# Patient Record
Sex: MALE | Race: WHITE | NOT HISPANIC OR LATINO | Employment: FULL TIME | URBAN - METROPOLITAN AREA
[De-identification: names, ages, dates, MRNs, and addresses within clinical notes are randomized per-mention and may not be internally consistent; named-entity substitution may affect disease eponyms.]

---

## 2023-08-23 PROBLEM — Z99.89 DEPENDENCE ON ENABLING MACHINE: Status: ACTIVE | Noted: 2023-08-23

## 2023-08-23 PROBLEM — J01.90 ACUTE SINUSITIS: Status: ACTIVE | Noted: 2023-08-23

## 2023-08-23 PROBLEM — I10 ESSENTIAL (PRIMARY) HYPERTENSION: Status: ACTIVE | Noted: 2023-08-23

## 2023-08-23 PROBLEM — J45.909 ASTHMATIC BRONCHITIS (HHS-HCC): Status: ACTIVE | Noted: 2023-08-23

## 2023-08-23 RX ORDER — FLUTICASONE PROPIONATE 50 MCG
2 SPRAY, SUSPENSION (ML) NASAL DAILY PRN
COMMUNITY
End: 2023-10-12 | Stop reason: ALTCHOICE

## 2023-08-23 RX ORDER — DIPHENHYDRAMINE HCL 25 MG
2 CAPSULE ORAL EVERY 8 HOURS PRN
COMMUNITY

## 2023-08-23 RX ORDER — LEVOCETIRIZINE DIHYDROCHLORIDE 5 MG/1
1 TABLET, FILM COATED ORAL DAILY
COMMUNITY
Start: 2019-09-10 | End: 2023-10-12 | Stop reason: ALTCHOICE

## 2023-08-23 RX ORDER — AMLODIPINE BESYLATE 10 MG/1
1 TABLET ORAL DAILY
COMMUNITY
End: 2023-10-12 | Stop reason: SDUPTHER

## 2023-08-23 RX ORDER — METOPROLOL SUCCINATE 100 MG/1
1 TABLET, EXTENDED RELEASE ORAL DAILY
COMMUNITY
End: 2023-10-12 | Stop reason: SDUPTHER

## 2023-10-12 ENCOUNTER — OFFICE VISIT (OUTPATIENT)
Dept: PRIMARY CARE | Facility: CLINIC | Age: 43
End: 2023-10-12
Payer: COMMERCIAL

## 2023-10-12 VITALS
RESPIRATION RATE: 18 BRPM | SYSTOLIC BLOOD PRESSURE: 140 MMHG | HEIGHT: 71 IN | TEMPERATURE: 97.8 F | BODY MASS INDEX: 40.91 KG/M2 | OXYGEN SATURATION: 97 % | WEIGHT: 292.2 LBS | HEART RATE: 75 BPM | DIASTOLIC BLOOD PRESSURE: 90 MMHG

## 2023-10-12 DIAGNOSIS — Z23 ENCOUNTER FOR IMMUNIZATION: ICD-10-CM

## 2023-10-12 DIAGNOSIS — I10 ESSENTIAL (PRIMARY) HYPERTENSION: Primary | ICD-10-CM

## 2023-10-12 PROCEDURE — 99213 OFFICE O/P EST LOW 20 MIN: CPT | Performed by: FAMILY MEDICINE

## 2023-10-12 PROCEDURE — 1036F TOBACCO NON-USER: CPT | Performed by: FAMILY MEDICINE

## 2023-10-12 PROCEDURE — 90471 IMMUNIZATION ADMIN: CPT | Performed by: FAMILY MEDICINE

## 2023-10-12 PROCEDURE — 3077F SYST BP >= 140 MM HG: CPT | Performed by: FAMILY MEDICINE

## 2023-10-12 PROCEDURE — 3080F DIAST BP >= 90 MM HG: CPT | Performed by: FAMILY MEDICINE

## 2023-10-12 RX ORDER — FLUTICASONE PROPIONATE AND SALMETEROL 250; 50 UG/1; UG/1
1 POWDER RESPIRATORY (INHALATION) 2 TIMES DAILY
COMMUNITY
Start: 2012-10-30

## 2023-10-12 RX ORDER — METOPROLOL SUCCINATE 100 MG/1
100 TABLET, EXTENDED RELEASE ORAL DAILY
Qty: 90 TABLET | Refills: 3 | Status: SHIPPED | OUTPATIENT
Start: 2023-10-12 | End: 2024-02-08 | Stop reason: SDUPTHER

## 2023-10-12 RX ORDER — AMLODIPINE BESYLATE 10 MG/1
10 TABLET ORAL DAILY
Qty: 90 TABLET | Refills: 3 | Status: SHIPPED | OUTPATIENT
Start: 2023-10-12 | End: 2024-02-08 | Stop reason: SDUPTHER

## 2023-10-12 ASSESSMENT — PAIN SCALES - GENERAL: PAINLEVEL: 0-NO PAIN

## 2023-10-12 ASSESSMENT — PATIENT HEALTH QUESTIONNAIRE - PHQ9
1. LITTLE INTEREST OR PLEASURE IN DOING THINGS: NOT AT ALL
SUM OF ALL RESPONSES TO PHQ9 QUESTIONS 1 & 2: 0
2. FEELING DOWN, DEPRESSED OR HOPELESS: NOT AT ALL

## 2023-10-12 NOTE — PROGRESS NOTES
"Subjective       Patient ID: Camron Constantino is a 43 y.o. male here for f/u HTN. He denies CP, SOB, or edema. He gets some HOLMAN with stairs but he is capable of running in events. He has not lot weight.BP has not been checked at home.    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    He indicated that his mother is alive. He indicated that his father is . He indicated that the status of his sister is unknown.      Social History     Tobacco Use   Smoking Status Never   Smokeless Tobacco Never       Objectives:  Vitals:    10/12/23 0825   BP: 140/90  Comment: initial /94   Pulse: 75   Resp: 18   Temp: 36.6 °C (97.8 °F)   SpO2: 97%   Weight: 133 kg (292 lb 3.2 oz)   Height: 1.803 m (5' 11\")   PainSc: 0-No pain     Body mass index is 40.75 kg/m².  Lungs: CTA  Heart: RRR  Abd: NABS, soft, NT  Ext: no c,c,e, good pulses      Assessment/Plan   Diagnoses and all orders for this visit:  Essential (primary) hypertension  -     amLODIPine (Norvasc) 10 mg tablet; Take 1 tablet (10 mg) by mouth once daily.  -     metoprolol succinate XL (Toprol-XL) 100 mg 24 hr tablet; Take 1 tablet (100 mg) by mouth once daily.  for 90 day(s)  Encounter for immunization  -     Flu vaccine, quadrivalent, recombinant, preservative free, adult (FLUBLOK)  Other orders  -     Follow Up In Primary Care - Health Maintenance; Future         Jacqueline Kapadia M.D.  Family Medicine Physician   "

## 2024-02-08 DIAGNOSIS — I10 ESSENTIAL (PRIMARY) HYPERTENSION: ICD-10-CM

## 2024-02-08 RX ORDER — AMLODIPINE BESYLATE 10 MG/1
10 TABLET ORAL DAILY
Qty: 90 TABLET | Refills: 0 | Status: SHIPPED | OUTPATIENT
Start: 2024-02-08 | End: 2025-02-07

## 2024-02-08 RX ORDER — METOPROLOL SUCCINATE 100 MG/1
100 TABLET, EXTENDED RELEASE ORAL DAILY
Qty: 90 TABLET | Refills: 0 | Status: SHIPPED | OUTPATIENT
Start: 2024-02-08 | End: 2025-02-07

## 2024-04-12 ENCOUNTER — APPOINTMENT (OUTPATIENT)
Dept: PRIMARY CARE | Facility: CLINIC | Age: 44
End: 2024-04-12
Payer: COMMERCIAL

## 2024-06-27 DIAGNOSIS — I10 ESSENTIAL (PRIMARY) HYPERTENSION: ICD-10-CM

## 2024-06-27 NOTE — TELEPHONE ENCOUNTER
Needs refill on Amlodipine and Metoprolol sent to [facility]. He is out of his medication and is asking for 30 day be sent to local pharmacy while the rest is to be sent to the mail order.     FU: 7/19/24

## 2024-07-01 RX ORDER — METOPROLOL SUCCINATE 100 MG/1
100 TABLET, EXTENDED RELEASE ORAL DAILY
Qty: 30 TABLET | Refills: 0 | Status: SHIPPED | OUTPATIENT
Start: 2024-07-01 | End: 2024-07-31

## 2024-07-01 RX ORDER — AMLODIPINE BESYLATE 10 MG/1
10 TABLET ORAL DAILY
Qty: 30 TABLET | Refills: 0 | Status: SHIPPED | OUTPATIENT
Start: 2024-07-01 | End: 2024-07-31

## 2024-07-01 NOTE — TELEPHONE ENCOUNTER
30 day coverage sent. Will see patient at upcoming appointment to make sure regimen is stable prior to sending mail order refills

## 2024-07-19 ENCOUNTER — OFFICE VISIT (OUTPATIENT)
Dept: PRIMARY CARE | Facility: CLINIC | Age: 44
End: 2024-07-19
Payer: COMMERCIAL

## 2024-07-19 VITALS
HEART RATE: 61 BPM | SYSTOLIC BLOOD PRESSURE: 130 MMHG | WEIGHT: 278.4 LBS | BODY MASS INDEX: 38.83 KG/M2 | DIASTOLIC BLOOD PRESSURE: 80 MMHG | OXYGEN SATURATION: 97 % | TEMPERATURE: 99 F

## 2024-07-19 DIAGNOSIS — Z12.5 PROSTATE CANCER SCREENING: ICD-10-CM

## 2024-07-19 DIAGNOSIS — Z11.59 NEED FOR HEPATITIS C SCREENING TEST: ICD-10-CM

## 2024-07-19 DIAGNOSIS — I10 ESSENTIAL (PRIMARY) HYPERTENSION: Primary | ICD-10-CM

## 2024-07-19 DIAGNOSIS — G47.33 OSA (OBSTRUCTIVE SLEEP APNEA): ICD-10-CM

## 2024-07-19 DIAGNOSIS — R73.9 HYPERGLYCEMIA: ICD-10-CM

## 2024-07-19 PROBLEM — J45.909 ASTHMATIC BRONCHITIS (HHS-HCC): Status: RESOLVED | Noted: 2023-08-23 | Resolved: 2024-07-19

## 2024-07-19 PROCEDURE — 3079F DIAST BP 80-89 MM HG: CPT | Performed by: FAMILY MEDICINE

## 2024-07-19 PROCEDURE — 99214 OFFICE O/P EST MOD 30 MIN: CPT | Performed by: FAMILY MEDICINE

## 2024-07-19 PROCEDURE — 3075F SYST BP GE 130 - 139MM HG: CPT | Performed by: FAMILY MEDICINE

## 2024-07-19 RX ORDER — METOPROLOL SUCCINATE 100 MG/1
100 TABLET, EXTENDED RELEASE ORAL DAILY
Qty: 90 TABLET | Refills: 3 | Status: SHIPPED | OUTPATIENT
Start: 2024-07-19 | End: 2025-07-19

## 2024-07-19 RX ORDER — AMLODIPINE BESYLATE 10 MG/1
10 TABLET ORAL DAILY
Qty: 90 TABLET | Refills: 3 | Status: SHIPPED | OUTPATIENT
Start: 2024-07-19 | End: 2025-07-19

## 2024-07-19 ASSESSMENT — ENCOUNTER SYMPTOMS
LOSS OF SENSATION IN FEET: 0
DEPRESSION: 0
OCCASIONAL FEELINGS OF UNSTEADINESS: 0

## 2024-07-19 ASSESSMENT — LIFESTYLE VARIABLES
SKIP TO QUESTIONS 9-10: 0
HOW OFTEN DO YOU HAVE SIX OR MORE DRINKS ON ONE OCCASION: NEVER
AUDIT-C TOTAL SCORE: 4
HOW OFTEN DO YOU HAVE A DRINK CONTAINING ALCOHOL: 2-3 TIMES A WEEK
HOW MANY STANDARD DRINKS CONTAINING ALCOHOL DO YOU HAVE ON A TYPICAL DAY: 3 OR 4

## 2024-07-19 ASSESSMENT — PATIENT HEALTH QUESTIONNAIRE - PHQ9
2. FEELING DOWN, DEPRESSED OR HOPELESS: NOT AT ALL
1. LITTLE INTEREST OR PLEASURE IN DOING THINGS: NOT AT ALL
SUM OF ALL RESPONSES TO PHQ9 QUESTIONS 1 & 2: 0

## 2024-07-19 ASSESSMENT — PAIN SCALES - GENERAL: PAINLEVEL: 0-NO PAIN

## 2024-07-19 NOTE — PROGRESS NOTES
Outpatient Visit Note    Chief Complaint   Patient presents with    Hypertension         HPI:  Camron Constantino is a 44 y.o. male who presents to the office to establish with new PCP and for medication follow up. He was previously established with Dr. Kapadia, having last been seen on 10/12/23 for blood pressure follow up. At that time he was doing well on stable regimen of amlodipine 10mg and metoprolol XL 100mg. No reports of chest pain, shortness of breath, lightheadedness or dizziness. Continues to be compliant with no reported adverse side efforts or symptomatic complaints.    Last panel of blood work completed on 4/21/23 including CBC, CMP and lipid panel, which was remarkable for mild hyperglycemia.    Does have history of RINA to which he has historically used a CPAP for the last several years. Denies have any monitoring of setting with equipment recently malfunctioning. Would be interested in having new assessment for replacement equipment.    Current Medications  Current Outpatient Medications   Medication Instructions    amLODIPine (NORVASC) 10 mg, oral, Daily    diphenhydrAMINE (BENADryl) 25 mg capsule 2 capsules, oral, Every 8 hours PRN    metoprolol succinate XL (TOPROL-XL) 100 mg, oral, Daily,  for 90 day(s)    multivitamin,therapeutic (THERAPEUTIC MULTIVITAMIN ORAL) 1 tablet, oral, Daily        Allergies  Allergies   Allergen Reactions    Erythromycin Other     vomiting    Penicillins Other     Upset stomach        Past Medical History:   Diagnosis Date    Arthritis     Hypertension     Microstomia     Microstomia      Past Surgical History:   Procedure Laterality Date    OTHER SURGICAL HISTORY  03/01/2016    Otoplasty - Left Ear     Family History   Problem Relation Name Age of Onset    Hypertension Mother      Other (ruptured AAA) Father      No Known Problems Sister       Social History     Tobacco Use    Smoking status: Never    Smokeless tobacco: Never   Vaping Use    Vaping status: Never Used    Substance Use Topics    Alcohol use: Yes     Alcohol/week: 2.0 standard drinks of alcohol     Types: 2 Standard drinks or equivalent per week    Drug use: Never       ROS  All pertinent positive symptoms are included in the history of present illness.  All other systems have been reviewed and are negative and noncontributory to this patient's current ailments.    VITAL SIGNS  Vitals:    07/19/24 1456   BP: 130/80   Pulse: 61   Temp: 37.2 °C (99 °F)   SpO2: 97%       PHYSICAL EXAM  GENERAL APPEARANCE: alert and oriented, Pleasant and cooperative, No Acute Distress.   HEENT: EOMI, PERRLA, MMM.   NECK: no lymphadenopathy, no thyromegaly, supple.   HEART:  regular rate and rhythm, no murmurs.   LUNGS: clear to auscultation bilaterally, no wheezes/rhonchi/rales.   EXTREMITIES: no edema, pulses 2 plus bilaterally, no clubbing.   SKIN: normal, warm, dry, no rash.   NEUROLOGIC EXAM: non-focal exam; DTR's 2+ bilaterally and symmetric, tone normal.   MUSCULOSKELETAL: no gross abnormalities, FROM of all extremities.          Assessment/Plan   Problem List Items Addressed This Visit             ICD-10-CM    Essential (primary) hypertension - Primary I10     - Blood pressure stable in office today  - Will continue on current regimen without modification  - Continue to focus on healthy, balanced diet with moderation of salt/caffiene         Relevant Medications    amLODIPine (Norvasc) 10 mg tablet    metoprolol succinate XL (Toprol-XL) 100 mg 24 hr tablet    Other Relevant Orders    TSH with reflex to Free T4 if abnormal    Lipid Panel    Comprehensive Metabolic Panel    CBC    RINA (obstructive sleep apnea) G47.33    Relevant Orders    Referral to Adult Sleep Medicine     Other Visit Diagnoses         Codes    Prostate cancer screening     Z12.5    Relevant Orders    Prostate Spec.Ag,Screen    Need for hepatitis C screening test     Z11.59    Relevant Orders    Hepatitis C antibody    Hyperglycemia     R73.9    Relevant Orders     Hemoglobin A1c            Counseling:       Medication education:         Education:  The patient is counseled regarding potential side-effects of all new medications        Understanding:  Patient expressed understanding        Adherence:  No barriers to adherence identified    ** Please excuse any errors in grammar or translation related to this dictation. Voice recognition software was utilized to prepare this document. **

## 2024-07-19 NOTE — PATIENT INSTRUCTIONS
Problem List Items Addressed This Visit             ICD-10-CM    Essential (primary) hypertension - Primary I10     - Blood pressure stable in office today  - Will continue on current regimen without modification  - Continue to focus on healthy, balanced diet with moderation of salt/caffiene         Relevant Medications    amLODIPine (Norvasc) 10 mg tablet    metoprolol succinate XL (Toprol-XL) 100 mg 24 hr tablet    Other Relevant Orders    TSH with reflex to Free T4 if abnormal    Lipid Panel    Comprehensive Metabolic Panel    CBC     Other Visit Diagnoses         Codes    Prostate cancer screening     Z12.5    Relevant Orders    Prostate Spec.Ag,Screen    Need for hepatitis C screening test     Z11.59    Relevant Orders    Hepatitis C antibody    Hyperglycemia     R73.9    Relevant Orders    Hemoglobin A1c            Counseling:       Medication education:         Education:  The patient is counseled regarding potential side-effects of all new medications        Understanding:  Patient expressed understanding        Adherence:  No barriers to adherence identified    ** Please excuse any errors in grammar or translation related to this dictation. Voice recognition software was utilized to prepare this document. **

## 2024-07-19 NOTE — ASSESSMENT & PLAN NOTE
- Blood pressure stable in office today  - Will continue on current regimen without modification  - Continue to focus on healthy, balanced diet with moderation of salt/caffiene

## 2024-07-23 ENCOUNTER — LAB (OUTPATIENT)
Dept: LAB | Facility: LAB | Age: 44
End: 2024-07-23
Payer: COMMERCIAL

## 2024-07-23 DIAGNOSIS — Z11.59 NEED FOR HEPATITIS C SCREENING TEST: ICD-10-CM

## 2024-07-23 DIAGNOSIS — R73.9 HYPERGLYCEMIA: ICD-10-CM

## 2024-07-23 DIAGNOSIS — I10 ESSENTIAL (PRIMARY) HYPERTENSION: ICD-10-CM

## 2024-07-23 DIAGNOSIS — Z12.5 PROSTATE CANCER SCREENING: ICD-10-CM

## 2024-07-23 LAB
ALBUMIN SERPL-MCNC: 4.1 G/DL (ref 3.5–5)
ALP BLD-CCNC: 64 U/L (ref 35–125)
ALT SERPL-CCNC: 14 U/L (ref 5–40)
ANION GAP SERPL CALC-SCNC: 11 MMOL/L
AST SERPL-CCNC: 14 U/L (ref 5–40)
BILIRUB SERPL-MCNC: 0.4 MG/DL (ref 0.1–1.2)
BUN SERPL-MCNC: 14 MG/DL (ref 8–25)
CALCIUM SERPL-MCNC: 8.8 MG/DL (ref 8.5–10.4)
CHLORIDE SERPL-SCNC: 102 MMOL/L (ref 97–107)
CHOLEST SERPL-MCNC: 179 MG/DL (ref 133–200)
CHOLEST/HDLC SERPL: 4.2 {RATIO}
CO2 SERPL-SCNC: 26 MMOL/L (ref 24–31)
CREAT SERPL-MCNC: 1.1 MG/DL (ref 0.4–1.6)
EGFRCR SERPLBLD CKD-EPI 2021: 85 ML/MIN/1.73M*2
ERYTHROCYTE [DISTWIDTH] IN BLOOD BY AUTOMATED COUNT: 12.8 % (ref 11.5–14.5)
EST. AVERAGE GLUCOSE BLD GHB EST-MCNC: 111 MG/DL
GLUCOSE SERPL-MCNC: 105 MG/DL (ref 65–99)
HBA1C MFR BLD: 5.5 %
HCT VFR BLD AUTO: 44.5 % (ref 41–52)
HCV AB SER QL: NONREACTIVE
HDLC SERPL-MCNC: 43 MG/DL
HGB BLD-MCNC: 14.7 G/DL (ref 13.5–17.5)
LDLC SERPL CALC-MCNC: 115 MG/DL (ref 65–130)
MCH RBC QN AUTO: 29.5 PG (ref 26–34)
MCHC RBC AUTO-ENTMCNC: 33 G/DL (ref 32–36)
MCV RBC AUTO: 89 FL (ref 80–100)
NRBC BLD-RTO: 0 /100 WBCS (ref 0–0)
PLATELET # BLD AUTO: 185 X10*3/UL (ref 150–450)
POTASSIUM SERPL-SCNC: 4.3 MMOL/L (ref 3.4–5.1)
PROT SERPL-MCNC: 6.7 G/DL (ref 5.9–7.9)
PSA SERPL-MCNC: 0.2 NG/ML
RBC # BLD AUTO: 4.98 X10*6/UL (ref 4.5–5.9)
SODIUM SERPL-SCNC: 139 MMOL/L (ref 133–145)
TRIGL SERPL-MCNC: 107 MG/DL (ref 40–150)
TSH SERPL DL<=0.05 MIU/L-ACNC: 1.74 MIU/L (ref 0.27–4.2)
WBC # BLD AUTO: 6.7 X10*3/UL (ref 4.4–11.3)

## 2024-07-23 PROCEDURE — 84443 ASSAY THYROID STIM HORMONE: CPT

## 2024-07-23 PROCEDURE — 84153 ASSAY OF PSA TOTAL: CPT

## 2024-07-23 PROCEDURE — 80053 COMPREHEN METABOLIC PANEL: CPT

## 2024-07-23 PROCEDURE — 83036 HEMOGLOBIN GLYCOSYLATED A1C: CPT

## 2024-07-23 PROCEDURE — 80061 LIPID PANEL: CPT

## 2024-07-23 PROCEDURE — 85027 COMPLETE CBC AUTOMATED: CPT

## 2024-07-23 PROCEDURE — 36415 COLL VENOUS BLD VENIPUNCTURE: CPT

## 2024-07-23 PROCEDURE — 86803 HEPATITIS C AB TEST: CPT

## 2024-07-29 ENCOUNTER — PATIENT MESSAGE (OUTPATIENT)
Dept: PRIMARY CARE | Facility: CLINIC | Age: 44
End: 2024-07-29
Payer: COMMERCIAL

## 2024-07-29 DIAGNOSIS — R79.89 LOW TESTOSTERONE: Primary | ICD-10-CM

## 2024-08-02 ENCOUNTER — LAB (OUTPATIENT)
Dept: LAB | Facility: LAB | Age: 44
End: 2024-08-02
Payer: COMMERCIAL

## 2024-08-02 DIAGNOSIS — R79.89 LOW TESTOSTERONE: ICD-10-CM

## 2024-08-02 PROCEDURE — 36415 COLL VENOUS BLD VENIPUNCTURE: CPT

## 2024-08-02 PROCEDURE — 84402 ASSAY OF FREE TESTOSTERONE: CPT

## 2024-08-11 LAB
TESTOSTERONE FREE (CHAN): 61.6 PG/ML (ref 35–155)
TESTOSTERONE,TOTAL,LC-MS/MS: 308 NG/DL (ref 250–1100)

## 2024-08-20 NOTE — PROGRESS NOTES
"[facility]  New Visit Note        HISTORY OF PRESENT ILLNESS     The patient's referring provider is: Yohannes Roberts DO    HISTORY OF PRESENT ILLNESS   Camron Constantino is a 44 y.o. male who presents to a [facility] for a sleep medicine evaluation with concerns of Consult and Sleep Apnea.     PAST SLEEP HISTORY    Patient has the following sleep-related diagnoses and sleep study results: not available today - thinks severe    CURRENT HISTORY    On today's visit, the patient reports he has been on CPAP for last 15 years or so. His old machine is no longer working.    Without CPAP he is groggy when he wakes up. He does wake often but attributes this to limbs falling asleep. He feels he can't sleep without CPAP. His n20 nasal mask \"is alright\" - sometimes seal is hard to keep and it puts pressure on his face. Open to trying n30 or p10.    Using SO's CPAP at this time  He thinks 8-12 cm H2O    Sometimes goes on trips/camping. Interested in something he can use when he has no electricity.    STOP  3 STP  BANG 3 BNG    Sleep schedule  on weekdays / work days:  Usual Bedtime  10 p  Falls asleep around  10 p  Wake time  5 a    Sleep schedule  on weekends/non work days :  same    Naps:   no    Average sleep duration 6-8 hours/day    Preferred sleeping position: back, side    Sleep-related ROS:    Sleep Initiation: no problems going to sleep    Sleep Maintenance: wakes often due to limbs falling asleep    Recreational drug use  Smoking: never  Alcohol consumption: 2/week  Caffeine consumption:  daily  Marijuana: never    ESS: 8      PHYSICAL EXAM     VITAL SIGNS: /84   Pulse 68   Ht 1.778 m (5' 10\")   Wt 122 kg (270 lb)   SpO2 98%   BMI 38.74 kg/m²      PREVIOUS WEIGHTS:  Wt Readings from Last 3 Encounters:   08/21/24 122 kg (270 lb)   07/19/24 126 kg (278 lb 6.4 oz)   10/12/23 133 kg (292 lb 3.2 oz)       PHYSICAL EXAM: GENERAL: alert oriented x 3 " "pleasant and cooperative no acute distress  MODIFIED MALLAMPATI SCORE: 3+  LATERAL PHARYNGEAL WALL: 2+  NECK EXAM: normal supple no adenopathy    RESULTS/DATA     No results found for: \"IRON\", \"TRANSFERRIN\", \"IRONSAT\", \"TIBC\", \"FERRITIN\"    ASSESSMENT/PLAN     Mr. Constantino is a 44 y.o. male and was referred to the [facility] for the following issues:    OBSTRUCTIVE SLEEP APNEA / SLEEPINESS  -Ordering sleep study to evaluate  -consider OAT or snorerx or battery backup when no electricity    BMI>35  -Body mass index is 38.74 kg/m².  today  -With sufficient weight loss may no longer require treatment for RINA    HYPERTENSION  BP Readings from Last 3 Encounters:   08/21/24 132/84   07/19/24 130/80   10/12/23 140/90      -Has been mildly elevated with current treatments    Followup 3 weeks after sleep study to review results        "

## 2024-08-21 ENCOUNTER — OFFICE VISIT (OUTPATIENT)
Dept: SLEEP MEDICINE | Facility: CLINIC | Age: 44
End: 2024-08-21
Payer: COMMERCIAL

## 2024-08-21 VITALS
SYSTOLIC BLOOD PRESSURE: 132 MMHG | OXYGEN SATURATION: 98 % | BODY MASS INDEX: 38.65 KG/M2 | DIASTOLIC BLOOD PRESSURE: 84 MMHG | WEIGHT: 270 LBS | HEIGHT: 70 IN | HEART RATE: 68 BPM

## 2024-08-21 DIAGNOSIS — G47.33 OSA (OBSTRUCTIVE SLEEP APNEA): Primary | ICD-10-CM

## 2024-08-21 DIAGNOSIS — G47.19 EXCESSIVE DAYTIME SLEEPINESS: ICD-10-CM

## 2024-08-21 DIAGNOSIS — I10 ESSENTIAL (PRIMARY) HYPERTENSION: ICD-10-CM

## 2024-08-21 PROCEDURE — 99214 OFFICE O/P EST MOD 30 MIN: CPT | Performed by: PHYSICIAN ASSISTANT

## 2024-08-21 PROCEDURE — 1036F TOBACCO NON-USER: CPT | Performed by: PHYSICIAN ASSISTANT

## 2024-08-21 PROCEDURE — 3008F BODY MASS INDEX DOCD: CPT | Performed by: PHYSICIAN ASSISTANT

## 2024-08-21 PROCEDURE — 3075F SYST BP GE 130 - 139MM HG: CPT | Performed by: PHYSICIAN ASSISTANT

## 2024-08-21 PROCEDURE — 3079F DIAST BP 80-89 MM HG: CPT | Performed by: PHYSICIAN ASSISTANT

## 2024-08-21 PROCEDURE — G2211 COMPLEX E/M VISIT ADD ON: HCPCS | Performed by: PHYSICIAN ASSISTANT

## 2024-08-21 PROCEDURE — 99204 OFFICE O/P NEW MOD 45 MIN: CPT | Performed by: PHYSICIAN ASSISTANT

## 2024-08-21 ASSESSMENT — SLEEP AND FATIGUE QUESTIONNAIRES
ESS-CHAD TOTAL SCORE: 8
HOW LIKELY ARE YOU TO NOD OFF OR FALL ASLEEP WHEN YOU ARE A PASSENGER IN A CAR FOR AN HOUR WITHOUT A BREAK: SLIGHT CHANCE OF DOZING
HOW LIKELY ARE YOU TO NOD OFF OR FALL ASLEEP WHILE LYING DOWN TO REST IN THE AFTERNOON WHEN CIRCUMSTANCES PERMIT: MODERATE CHANCE OF DOZING
HOW LIKELY ARE YOU TO NOD OFF OR FALL ASLEEP WHILE WATCHING TV: MODERATE CHANCE OF DOZING
SITING INACTIVE IN A PUBLIC PLACE LIKE A CLASS ROOM OR A MOVIE THEATER: WOULD NEVER DOZE
HOW LIKELY ARE YOU TO NOD OFF OR FALL ASLEEP WHILE SITTING AND TALKING TO SOMEONE: WOULD NEVER DOZE
HOW LIKELY ARE YOU TO NOD OFF OR FALL ASLEEP WHILE SITTING QUIETLY AFTER LUNCH WITHOUT ALCOHOL: SLIGHT CHANCE OF DOZING
HOW LIKELY ARE YOU TO NOD OFF OR FALL ASLEEP IN A CAR, WHILE STOPPED FOR A FEW MINUTES IN TRAFFIC: WOULD NEVER DOZE
HOW LIKELY ARE YOU TO NOD OFF OR FALL ASLEEP WHILE SITTING AND READING: MODERATE CHANCE OF DOZING

## 2024-08-21 ASSESSMENT — LIFESTYLE VARIABLES
SKIP TO QUESTIONS 9-10: 1
HOW OFTEN DO YOU HAVE A DRINK CONTAINING ALCOHOL: 2-4 TIMES A MONTH
HOW MANY STANDARD DRINKS CONTAINING ALCOHOL DO YOU HAVE ON A TYPICAL DAY: 1 OR 2
HOW OFTEN DO YOU HAVE SIX OR MORE DRINKS ON ONE OCCASION: NEVER
AUDIT-C TOTAL SCORE: 2

## 2024-08-21 ASSESSMENT — PATIENT HEALTH QUESTIONNAIRE - PHQ9
1. LITTLE INTEREST OR PLEASURE IN DOING THINGS: NOT AT ALL
2. FEELING DOWN, DEPRESSED OR HOPELESS: NOT AT ALL
SUM OF ALL RESPONSES TO PHQ9 QUESTIONS 1 & 2: 0

## 2024-08-21 ASSESSMENT — PAIN SCALES - GENERAL: PAINLEVEL: 0-NO PAIN

## 2024-08-21 NOTE — PATIENT INSTRUCTIONS
Thank you for coming to the Sleep Medicine Clinic today! Your sleep medicine provider today was: Paul Davis PA-C Below is a summary of your treatment plan, other important information, and our contact numbers:      TREATMENT PLAN     Call [phone]), option 3 to schedule your sleep study. When you have an appointment please call us back at [phone] to schedule a followup appointment 3-4 weeks after to review results.    Obstructive Sleep Apnea (RINA) is a sleep disorder where your upper airway muscles relax during sleep and the airway intermittently and repetitively narrows and collapses leading to partially blocked airway (hypopnea) or completely blocked airway (apnea) which, in turn, can disrupt breathing in sleep, lower oxygen levels while you sleep and cause night time wakings. Because both apnea and hypopnea may cause higher carbon dioxide or low oxygen levels, untreated RINA can lead to heart arrhythmia, elevation of blood pressure, and make it harder for the body to consolidate memory and facilitate metabolism (leading to higher blood sugars at night). Frequent partial arousals occur during sleep resulting in sleep deprivation and daytime sleepiness. RINA is associated with an increased risk of cardiovascular disease, stroke, hypertension, and insulin resistance. Moreover, untreated RINA with excessive daytime sleepiness can increase the risk of motor vehicular accidents.    Some conservative strategies for RINA regardless of RINA severity are:   Positional therapy - Avoid sleeping on your back.   Healthy diet and regular exercise to optimize weight is highly encouraged.   Avoid alcohol late in the evening and sedative-hypnotics as these substances can make sleep apnea worse.   Improve breathing through the nose with intranasal steroid spray, saline rinse, or antihistamines    Safety: Avoid driving vehicle and operating heavy equipment while sleepy. Drowsy driving may lead to life-threatening  motor vehicle accidents. A person driving while sleepy is 5 times more likely to have an accident. If you feel sleepy, pull over and take a short power nap (sleep for less than 30 minutes). Otherwise, ask somebody to drive you.    Treatment options for sleep apnea include weight management, positional therapy, Positive Airway Therapy (PAP) therapy, oral appliance therapy, hypoglossal nerve stimulator (Inspire) and select airway surgeries.      OUR SLEEP TESTING LOCATIONS     Our team will contact you to schedule your sleep study, however, you can contact us as follow:  Main Phone Line (scheduling only): [phone]), option 3  Adult and Pediatric Locations  [facility] (6 years and older): [facility] - 4th floor ([address]) After hours line: [phone]  [facility] (Main campus: All ages): [facility], 6th floor. After hours line: [phone]   [place] (18 years and older): [address] [facility], 2nd floor   [place] (18 years and older): [address] [facility] [facility]; 4th floor  After hours line: [phone]  [facility] (18 years and older) at [place]: [address] [facility]  After hours line: [phone]    [place] (5 years and older; younger considered on case-by-case basis): [address] [facility] [address] [facility]; [address], [address]. Scheduling  After hours line: [phone]   [place] (6 years and older): [address]   [place] (6 years and older): [address] [facility], [address]  After hours line: [phone]   [employer] (13 years and older) in [place]: [address], 2nd floor  After hours line: [phone]   [place] (13 year and older): [address]  After hours line: [phone]      IMPORTANT PHONE NUMBERS     Sleep Medicine Clinic Fax: [phone]  Appointments (for Adult Sleep Clinic): [phone] - option 2  Appointments (For Sleep Studies):  [phone] - option 3  Behavioral Sleep Medicine: [phone]    [employer] ([place]): [phone]  For clinical questions and refilling prescriptions: [phone]  Analilia Mcclelland (For [place]/Susan): P: [phone]  F: [phone]       CONTACTING YOUR SLEEP MEDICINE PROVIDER     Send a message directly to your provider through \"My Chart\", which is the email service through your  Records Account: [email or web address]   Call [phone] and leave a message. One of the administrative assistants will forward the message to your sleep medicine provider through \"My Chart\" and/or email.     Your sleep medicine provider for this visit was: Paul Davis PA-C       "

## 2024-10-22 ENCOUNTER — OFFICE VISIT (OUTPATIENT)
Dept: PRIMARY CARE | Facility: CLINIC | Age: 44
End: 2024-10-22
Payer: COMMERCIAL

## 2024-10-22 VITALS
SYSTOLIC BLOOD PRESSURE: 120 MMHG | DIASTOLIC BLOOD PRESSURE: 86 MMHG | TEMPERATURE: 96.5 F | HEART RATE: 79 BPM | HEIGHT: 70 IN | WEIGHT: 275 LBS | BODY MASS INDEX: 39.37 KG/M2 | OXYGEN SATURATION: 97 %

## 2024-10-22 DIAGNOSIS — J01.41 ACUTE RECURRENT PANSINUSITIS: Primary | ICD-10-CM

## 2024-10-22 DIAGNOSIS — Z23 ENCOUNTER FOR IMMUNIZATION: ICD-10-CM

## 2024-10-22 DIAGNOSIS — R05.1 ACUTE COUGH: ICD-10-CM

## 2024-10-22 PROCEDURE — 99214 OFFICE O/P EST MOD 30 MIN: CPT | Performed by: FAMILY MEDICINE

## 2024-10-22 PROCEDURE — 90656 IIV3 VACC NO PRSV 0.5 ML IM: CPT | Performed by: FAMILY MEDICINE

## 2024-10-22 PROCEDURE — 3008F BODY MASS INDEX DOCD: CPT | Performed by: FAMILY MEDICINE

## 2024-10-22 PROCEDURE — 90471 IMMUNIZATION ADMIN: CPT | Performed by: FAMILY MEDICINE

## 2024-10-22 PROCEDURE — 3079F DIAST BP 80-89 MM HG: CPT | Performed by: FAMILY MEDICINE

## 2024-10-22 PROCEDURE — 3074F SYST BP LT 130 MM HG: CPT | Performed by: FAMILY MEDICINE

## 2024-10-22 RX ORDER — BENZONATATE 100 MG/1
100 CAPSULE ORAL 3 TIMES DAILY PRN
Qty: 42 CAPSULE | Refills: 0 | Status: SHIPPED | OUTPATIENT
Start: 2024-10-22 | End: 2024-11-21

## 2024-10-22 RX ORDER — DOXYCYCLINE 100 MG/1
100 CAPSULE ORAL 2 TIMES DAILY
Qty: 14 CAPSULE | Refills: 0 | Status: SHIPPED | OUTPATIENT
Start: 2024-10-22 | End: 2024-10-29

## 2024-10-22 RX ORDER — FLUTICASONE PROPIONATE 50 MCG
1 SPRAY, SUSPENSION (ML) NASAL DAILY
Qty: 16 G | Refills: 0 | Status: SHIPPED | OUTPATIENT
Start: 2024-10-22 | End: 2025-10-22

## 2024-10-22 ASSESSMENT — PAIN SCALES - GENERAL: PAINLEVEL_OUTOF10: 0-NO PAIN

## 2024-10-22 ASSESSMENT — PATIENT HEALTH QUESTIONNAIRE - PHQ9
2. FEELING DOWN, DEPRESSED OR HOPELESS: NOT AT ALL
SUM OF ALL RESPONSES TO PHQ9 QUESTIONS 1 AND 2: 0
1. LITTLE INTEREST OR PLEASURE IN DOING THINGS: NOT AT ALL

## 2024-10-22 NOTE — PATIENT INSTRUCTIONS
Problem List Items Addressed This Visit             ICD-10-CM    Acute sinusitis - Primary J01.90     - Given your symptoms and duration of illness, we feel that you can benefit from antibiotic coverage at this time   - A prescription for doxycycline was sent to your pharmacy, please take this medication as prescribed  - Flonase additionally encouraged secondary to sinus pressure complaints   - Recommend supportive care with increased fluid intake to thin secretions, Ibuprofen or Tylenol as needed for pain or fever, and steamy showers/saline nasal rinses to help clear the nasal passages   - You may consider tea with honey or a cinnamon stick as these have natural antiviral and antibiotic properties   - Call if symptoms worsen or do not improve with these treatments         Relevant Medications    fluticasone (Flonase) 50 mcg/actuation nasal spray    doxycycline (Vibramycin) 100 mg capsule     Other Visit Diagnoses         Codes    Acute cough     R05.1    Relevant Medications    benzonatate (Tessalon) 100 mg capsule    Encounter for immunization     Z23    Relevant Orders    Flu vaccine, trivalent, preservative free, age 6 months and greater (Fluarix/Fluzone/Flulaval)              Counseling:       Medication education:         Education:  The patient is counseled regarding potential side-effects of all new medications        Understanding:  Patient expressed understanding        Adherence:  No barriers to adherence identified    ** Please excuse any errors in grammar or translation related to this dictation. Voice recognition software was utilized to prepare this document. **

## 2024-10-22 NOTE — ASSESSMENT & PLAN NOTE
- Given your symptoms and duration of illness, we feel that you can benefit from antibiotic coverage at this time   - A prescription for doxycycline was sent to your pharmacy, please take this medication as prescribed  - Flonase additionally encouraged secondary to sinus pressure complaints   - Recommend supportive care with increased fluid intake to thin secretions, Ibuprofen or Tylenol as needed for pain or fever, and steamy showers/saline nasal rinses to help clear the nasal passages   - You may consider tea with honey or a cinnamon stick as these have natural antiviral and antibiotic properties   - Call if symptoms worsen or do not improve with these treatments

## 2024-10-22 NOTE — PROGRESS NOTES
Outpatient Visit Note    Chief Complaint   Patient presents with    Nasal Congestion     Sinus pressure/HA, coughing, post nasal drip. Pt was sick in Sept but had since calmed down but had started again over the weekend. Tried stella seltzer, musinex with slight relief         HPI:  Camron Constantino is a 44 y.o. male who presents to the office secondary to complaints of sinus congestion.  He was last seen in the office on 7/19/2024 to establish with new PCP and for medication follow up.     He was previously established with Dr. Kapadia, having last been seen on 10/12/23 for blood pressure follow up. At that time he was doing well on stable regimen of amlodipine 10mg and metoprolol XL 100mg. No reports of chest pain, shortness of breath, lightheadedness or dizziness. Continues to be compliant with no reported adverse side efforts or symptomatic complaints.    Routine blood work was completed in interval including TSH, lipid panel, CMP, CBC, PSA, hepatitis C, A1c and testosterone, which was remarkable for hyperglycemia with A1c of 5.5%.    Of note, patient has history of RINA to which he has historically used a CPAP for the last several years. Denies have any monitoring of setting with equipment recently malfunctioning.  Was ultimately given sleep referral equipment.    He reports recurrent sinus pressure with associated headache, cough and postnasal drip.  Stated to have similar symptoms in September which calm down with supportive care use though developed recurrent symptoms in the last 3 to 4 days.  Has been actively using Stella-[place] and Mucinex which provides slight relief.    Current Medications  Current Outpatient Medications   Medication Instructions    amLODIPine (NORVASC) 10 mg, oral, Daily    benzonatate (TESSALON) 100 mg, oral, 3 times daily PRN, Do not crush or chew.    diphenhydrAMINE (BENADryl) 25 mg capsule 2 capsules, Every 8 hours PRN    doxycycline (VIBRAMYCIN) 100 mg, oral, 2 times daily, Take  with at least 8 ounces (large glass) of water, do not lie down for 30 minutes after    fluticasone (Flonase) 50 mcg/actuation nasal spray 1 spray, Each Nostril, Daily, Shake gently. Before first use, prime pump. After use, clean tip and replace cap.    metoprolol succinate XL (TOPROL-XL) 100 mg, oral, Daily,  for 90 day(s)    multivitamin,therapeutic (THERAPEUTIC MULTIVITAMIN ORAL) 1 tablet, Daily        Allergies  Allergies   Allergen Reactions    Amoxicillin Diarrhea and Nausea/vomiting    Erythromycin Other     vomiting    Penicillins Other     Upset stomach        Past Medical History:   Diagnosis Date    Arthritis     Hypertension     Microstomia     Microstomia      Past Surgical History:   Procedure Laterality Date    OTHER SURGICAL HISTORY  03/01/2016    Otoplasty - Left Ear     Family History   Problem Relation Name Age of Onset    Hypertension Mother      Other (ruptured AAA) Father      No Known Problems Sister       Social History     Tobacco Use    Smoking status: Never     Passive exposure: Never    Smokeless tobacco: Never   Vaping Use    Vaping status: Never Used   Substance Use Topics    Alcohol use: Yes     Alcohol/week: 2.0 standard drinks of alcohol     Types: 2 Standard drinks or equivalent per week    Drug use: Not Currently       ROS  All pertinent positive symptoms are included in the history of present illness.  All other systems have been reviewed and are negative and noncontributory to this patient's current ailments.    VITAL SIGNS  Vitals:    10/22/24 0942   BP: 120/86   Pulse: 79   Temp: 35.8 °C (96.5 °F)   SpO2: 97%         PHYSICAL EXAM  GENERAL APPEARANCE: alert and oriented, Pleasant and cooperative, No Acute Distress.   HEENT: EOMI, PERRLA, right tympanic membrane clear and flat, nose clear, Oropharynx clear with MMM.   NECK: no lymphadenopathy, no thyromegaly, supple.   HEART:  regular rate and rhythm, no murmurs.   LUNGS: clear to auscultation bilaterally, no  wheezes/rhonchi/rales.   EXTREMITIES: no edema, pulses 2 plus bilaterally, no clubbing.   SKIN: normal, warm, dry, no rash.   NEUROLOGIC EXAM: non-focal exam; DTR's 2+ bilaterally and symmetric, tone normal.   MUSCULOSKELETAL: no gross abnormalities, FROM of all extremities.          Assessment/Plan   Problem List Items Addressed This Visit             ICD-10-CM    Acute sinusitis - Primary J01.90     - Given your symptoms and duration of illness, we feel that you can benefit from antibiotic coverage at this time   - A prescription for doxycycline was sent to your pharmacy, please take this medication as prescribed  - Flonase additionally encouraged secondary to sinus pressure complaints   - Recommend supportive care with increased fluid intake to thin secretions, Ibuprofen or Tylenol as needed for pain or fever, and steamy showers/saline nasal rinses to help clear the nasal passages   - You may consider tea with honey or a cinnamon stick as these have natural antiviral and antibiotic properties   - Call if symptoms worsen or do not improve with these treatments         Relevant Medications    fluticasone (Flonase) 50 mcg/actuation nasal spray    doxycycline (Vibramycin) 100 mg capsule     Other Visit Diagnoses         Codes    Acute cough     R05.1    Relevant Medications    benzonatate (Tessalon) 100 mg capsule    Encounter for immunization     Z23    Relevant Orders    Flu vaccine, trivalent, preservative free, age 6 months and greater (Fluarix/Fluzone/Flulaval) (Completed)              Counseling:       Medication education:         Education:  The patient is counseled regarding potential side-effects of all new medications        Understanding:  Patient expressed understanding        Adherence:  No barriers to adherence identified    ** Please excuse any errors in grammar or translation related to this dictation. Voice recognition software was utilized to prepare this document. **

## 2025-02-20 ENCOUNTER — APPOINTMENT (OUTPATIENT)
Dept: PRIMARY CARE | Facility: CLINIC | Age: 45
End: 2025-02-20
Payer: COMMERCIAL

## 2025-08-08 ENCOUNTER — TELEPHONE (OUTPATIENT)
Dept: PRIMARY CARE | Facility: CLINIC | Age: 45
End: 2025-08-08
Payer: COMMERCIAL

## 2025-08-08 DIAGNOSIS — I10 ESSENTIAL (PRIMARY) HYPERTENSION: ICD-10-CM

## 2025-08-08 NOTE — TELEPHONE ENCOUNTER
Med refill      amLODIPine (Norvasc) 10 mg tablet Take 1 tablet (10 mg) by mouth once daily.  Number of times this order has been changed since signin          metoprolol succinate XL (Toprol-XL) 100 mg 24 hr tablet Take 1 tablet (100 mg) by mouth once daily.  for 90 day(s)  Number of times this order has been changed since signin       [facility] [address] [place], [place] [address] [address] [address]  Phone: [phone]  Fax: [phone]  KAYLI #: --     Pt is trying to establish care elsewhere

## 2025-08-10 RX ORDER — METOPROLOL SUCCINATE 100 MG/1
100 TABLET, EXTENDED RELEASE ORAL DAILY
Qty: 90 TABLET | Refills: 3 | OUTPATIENT
Start: 2025-08-10

## 2025-08-10 RX ORDER — METOPROLOL SUCCINATE 100 MG/1
100 TABLET, EXTENDED RELEASE ORAL DAILY
Qty: 90 TABLET | Refills: 3 | OUTPATIENT
Start: 2025-08-10 | End: 2026-08-10

## 2025-08-10 RX ORDER — AMLODIPINE BESYLATE 10 MG/1
10 TABLET ORAL DAILY
Qty: 90 TABLET | Refills: 3 | OUTPATIENT
Start: 2025-08-10 | End: 2026-08-10